# Patient Record
Sex: MALE | Race: WHITE | NOT HISPANIC OR LATINO | URBAN - METROPOLITAN AREA
[De-identification: names, ages, dates, MRNs, and addresses within clinical notes are randomized per-mention and may not be internally consistent; named-entity substitution may affect disease eponyms.]

---

## 2020-07-03 PROCEDURE — U0003 INFECTIOUS AGENT DETECTION BY NUCLEIC ACID (DNA OR RNA); SEVERE ACUTE RESPIRATORY SYNDROME CORONAVIRUS 2 (SARS-COV-2) (CORONAVIRUS DISEASE [COVID-19]), AMPLIFIED PROBE TECHNIQUE, MAKING USE OF HIGH THROUGHPUT TECHNOLOGIES AS DESCRIBED BY CMS-2020-01-R: HCPCS | Performed by: FAMILY MEDICINE

## 2020-07-06 ENCOUNTER — TELEPHONE (OUTPATIENT)
Dept: URGENT CARE | Facility: CLINIC | Age: 25
End: 2020-07-06

## 2020-09-11 ENCOUNTER — HOSPITAL ENCOUNTER (EMERGENCY)
Facility: HOSPITAL | Age: 25
Discharge: HOME | End: 2020-09-11
Admitting: EMERGENCY MEDICINE

## 2020-09-11 ENCOUNTER — APPOINTMENT (OUTPATIENT)
Dept: ULTRASOUND IMAGING | Facility: HOSPITAL | Age: 25
End: 2020-09-11

## 2020-09-11 VITALS
WEIGHT: 240 LBS | HEART RATE: 60 BPM | HEIGHT: 74 IN | BODY MASS INDEX: 30.8 KG/M2 | OXYGEN SATURATION: 97 % | DIASTOLIC BLOOD PRESSURE: 72 MMHG | TEMPERATURE: 98.4 F | SYSTOLIC BLOOD PRESSURE: 113 MMHG | RESPIRATION RATE: 17 BRPM

## 2020-09-11 DIAGNOSIS — M79.604 RIGHT LEG PAIN: Primary | ICD-10-CM

## 2020-09-11 PROCEDURE — 93926 LOWER EXTREMITY STUDY: CPT

## 2020-09-11 PROCEDURE — 93971 EXTREMITY STUDY: CPT

## 2020-09-11 PROCEDURE — 99283 EMERGENCY DEPT VISIT LOW MDM: CPT

## 2020-09-11 PROCEDURE — 96372 THER/PROPH/DIAG INJ SC/IM: CPT

## 2020-09-11 PROCEDURE — 25010000002 METHYLPREDNISOLONE PER 125 MG: Performed by: PHYSICIAN ASSISTANT

## 2020-09-11 RX ORDER — METHYLPREDNISOLONE SODIUM SUCCINATE 125 MG/2ML
80 INJECTION, POWDER, LYOPHILIZED, FOR SOLUTION INTRAMUSCULAR; INTRAVENOUS ONCE
Status: COMPLETED | OUTPATIENT
Start: 2020-09-11 | End: 2020-09-11

## 2020-09-11 RX ORDER — METHYLPREDNISOLONE 4 MG/1
TABLET ORAL
Qty: 21 TABLET | Refills: 0 | Status: SHIPPED | OUTPATIENT
Start: 2020-09-11

## 2020-09-11 RX ADMIN — METHYLPREDNISOLONE SODIUM SUCCINATE 80 MG: 125 INJECTION, POWDER, FOR SOLUTION INTRAMUSCULAR; INTRAVENOUS at 23:02

## 2020-09-12 NOTE — ED PROVIDER NOTES
Subjective   25-year-old male with no known past medical history presents to the emergency room with right lower extremity pain x2 days.  Patient states he was running and suddenly began having pain, numbness, and tingling in his right lower extremity from the knee down.  Patient states he does not know if he landed on it wrong or exactly what happened.  Patient states he went to [facility] in [place] earlier this evening and they sent him here to be checked for a blood clot.  Denies any aggravating or alleviating factors.  Denies any history of blood clots.      History provided by:  Patient   used: No        Review of Systems   Constitutional: Negative.  Negative for fever.   HENT: Negative.    Respiratory: Negative.    Cardiovascular: Negative.  Negative for chest pain.   Gastrointestinal: Negative.  Negative for abdominal pain.   Endocrine: Negative.    Genitourinary: Negative.  Negative for dysuria.   Musculoskeletal:        (+) Right leg pain   Skin: Negative.    Neurological: Negative.    Psychiatric/Behavioral: Negative.    All other systems reviewed and are negative.      No past medical history on file.    Allergies   Allergen Reactions   • Motrin [Ibuprofen] Other (See Comments)     Thinks it caused rash as a child       Past Surgical History:   Procedure Laterality Date   • EAR TUBES     • MANDIBLE SURGERY         No family history on file.    Social History     Socioeconomic History   • Marital status:      Spouse name: Not on file   • Number of children: Not on file   • Years of education: Not on file   • Highest education level: Not on file   Tobacco Use   • Smoking status: Never Smoker   Substance and Sexual Activity   • Alcohol use: Yes   • Drug use: Never   • Sexual activity: Defer           Objective   Physical Exam   Constitutional: He is oriented to person, place, and time. He appears well-developed and well-nourished. No distress.   HENT:   Head: Normocephalic  and atraumatic.   Right Ear: External ear normal.   Left Ear: External ear normal.   Nose: Nose normal.   Eyes: Pupils are equal, round, and reactive to light. Conjunctivae and EOM are normal.   Neck: Normal range of motion. Neck supple. No JVD present. No tracheal deviation present.   Cardiovascular: Normal rate, regular rhythm and normal heart sounds.   No murmur heard.  Pulses:       Dorsalis pedis pulses are 2+ on the right side, and 2+ on the left side.        Posterior tibial pulses are 2+ on the right side, and 2+ on the left side.   Bilateral lower extremity capillary refills less than 3 seconds   Pulmonary/Chest: Effort normal and breath sounds normal. No respiratory distress. He has no wheezes.   Abdominal: Soft. Bowel sounds are normal. There is no tenderness.   Musculoskeletal: Normal range of motion. He exhibits no edema or deformity.   Neurological: He is alert and oriented to person, place, and time. No cranial nerve deficit.   Skin: Skin is warm and dry. No rash noted. He is not diaphoretic. No erythema. No pallor.   Psychiatric: He has a normal mood and affect. His behavior is normal. Thought content normal.   Nursing note and vitals reviewed.      Procedures           ED Course  ED Course as of Sep 11 2342   Fri Sep 11, 2020   2338 IMPRESSION:  No deep venous thrombus in the right lower extremity.    Signer Name: Cy Meyer MD  Signed: 9/11/2020 11:13 PM  Workstation Name: [facility] Venous Doppler Lower Extremity Right (duplex) [TK]   2338 IMPRESSION:  No evidence of significant lower extremity arterial occlusive disease.    Signer Name: Cy Meyer MD  Signed: 9/11/2020 11:15 PM  Workstation Name: [facility]  [facility] Arterial Doppler Lower Extremity Right [TK]      ED Course User Index  [TK] Karen Davis PA-C                                           MDM  Number of Diagnoses or Management Options  Right leg  pain: new and requires workup     Amount and/or Complexity of Data Reviewed  Tests in the radiology section of CPT®: reviewed and ordered    Risk of Complications, Morbidity, and/or Mortality  Presenting problems: moderate  Diagnostic procedures: moderate  Management options: moderate    Patient Progress  Patient progress: stable      Final diagnoses:   Right leg pain            Karen Davis PA-C  09/11/20 [id number]

## 2023-08-11 ENCOUNTER — OFFICE VISIT (OUTPATIENT)
Dept: FAMILY MEDICINE CLINIC | Age: 28
End: 2023-08-11
Payer: COMMERCIAL

## 2023-08-11 ENCOUNTER — LAB (OUTPATIENT)
Dept: LAB | Facility: HOSPITAL | Age: 28
End: 2023-08-11
Payer: COMMERCIAL

## 2023-08-11 VITALS
DIASTOLIC BLOOD PRESSURE: 74 MMHG | OXYGEN SATURATION: 96 % | BODY MASS INDEX: 35.55 KG/M2 | HEIGHT: 74 IN | HEART RATE: 68 BPM | WEIGHT: 277 LBS | SYSTOLIC BLOOD PRESSURE: 118 MMHG

## 2023-08-11 DIAGNOSIS — E66.01 CLASS 2 SEVERE OBESITY DUE TO EXCESS CALORIES WITH SERIOUS COMORBIDITY AND BODY MASS INDEX (BMI) OF 35.0 TO 35.9 IN ADULT: Primary | ICD-10-CM

## 2023-08-11 DIAGNOSIS — E66.01 CLASS 2 SEVERE OBESITY DUE TO EXCESS CALORIES WITH SERIOUS COMORBIDITY AND BODY MASS INDEX (BMI) OF 35.0 TO 35.9 IN ADULT: ICD-10-CM

## 2023-08-11 DIAGNOSIS — E66.9 OBESITY (BMI 35.0-39.9 WITHOUT COMORBIDITY): ICD-10-CM

## 2023-08-11 LAB
25(OH)D3 SERPL-MCNC: 37.7 NG/ML (ref 30–100)
ALBUMIN SERPL-MCNC: 4.2 G/DL (ref 3.5–5.2)
ALBUMIN/GLOB SERPL: 1.6 G/DL
ALP SERPL-CCNC: 52 U/L (ref 39–117)
ALT SERPL W P-5'-P-CCNC: 32 U/L (ref 1–41)
ANION GAP SERPL CALCULATED.3IONS-SCNC: 9.2 MMOL/L (ref 5–15)
AST SERPL-CCNC: 25 U/L (ref 1–40)
BILIRUB SERPL-MCNC: 0.3 MG/DL (ref 0–1.2)
BUN SERPL-MCNC: 18 MG/DL (ref 6–20)
BUN/CREAT SERPL: 17.1 (ref 7–25)
CALCIUM SPEC-SCNC: 9.6 MG/DL (ref 8.6–10.5)
CHLORIDE SERPL-SCNC: 107 MMOL/L (ref 98–107)
CHOLEST SERPL-MCNC: 126 MG/DL (ref 0–200)
CO2 SERPL-SCNC: 23.8 MMOL/L (ref 22–29)
CREAT SERPL-MCNC: 1.05 MG/DL (ref 0.76–1.27)
CRP SERPL-MCNC: <0.3 MG/DL (ref 0–0.5)
DEPRECATED RDW RBC AUTO: 37 FL (ref 37–54)
EGFRCR SERPLBLD CKD-EPI 2021: 100.4 ML/MIN/1.73
ERYTHROCYTE [DISTWIDTH] IN BLOOD BY AUTOMATED COUNT: 12.2 % (ref 12.3–15.4)
FOLATE SERPL-MCNC: 11.2 NG/ML (ref 4.78–24.2)
GLOBULIN UR ELPH-MCNC: 2.7 GM/DL
GLUCOSE SERPL-MCNC: 86 MG/DL (ref 65–99)
HBA1C MFR BLD: 5.4 % (ref 4.8–5.6)
HCT VFR BLD AUTO: 46.2 % (ref 37.5–51)
HDLC SERPL QL: 3.41
HDLC SERPL-MCNC: 37 MG/DL (ref 40–60)
HGB BLD-MCNC: 15.7 G/DL (ref 13–17.7)
LDLC SERPL CALC-MCNC: 73 MG/DL (ref 0–100)
MCH RBC QN AUTO: 29.1 PG (ref 26.6–33)
MCHC RBC AUTO-ENTMCNC: 34 G/DL (ref 31.5–35.7)
MCV RBC AUTO: 85.6 FL (ref 79–97)
PLATELET # BLD AUTO: 289 10*3/MM3 (ref 140–450)
PMV BLD AUTO: 9.5 FL (ref 6–12)
POTASSIUM SERPL-SCNC: 4.5 MMOL/L (ref 3.5–5.2)
PROT SERPL-MCNC: 6.9 G/DL (ref 6–8.5)
RBC # BLD AUTO: 5.4 10*6/MM3 (ref 4.14–5.8)
SODIUM SERPL-SCNC: 140 MMOL/L (ref 136–145)
T3 SERPL-MCNC: 111 NG/DL (ref 80–200)
T4 FREE SERPL-MCNC: 1.62 NG/DL (ref 0.93–1.7)
TRIGL SERPL-MCNC: 82 MG/DL (ref 0–150)
TSH SERPL DL<=0.05 MIU/L-ACNC: 1.23 UIU/ML (ref 0.27–4.2)
VIT B12 BLD-MCNC: 1042 PG/ML (ref 211–946)
VLDLC SERPL-MCNC: 16 MG/DL (ref 5–40)
WBC NRBC COR # BLD: 6.02 10*3/MM3 (ref 3.4–10.8)

## 2023-08-11 PROCEDURE — 83036 HEMOGLOBIN GLYCOSYLATED A1C: CPT

## 2023-08-11 PROCEDURE — 80061 LIPID PANEL: CPT

## 2023-08-11 PROCEDURE — 86140 C-REACTIVE PROTEIN: CPT

## 2023-08-11 PROCEDURE — 84480 ASSAY TRIIODOTHYRONINE (T3): CPT

## 2023-08-11 PROCEDURE — 82607 VITAMIN B-12: CPT

## 2023-08-11 PROCEDURE — 82306 VITAMIN D 25 HYDROXY: CPT

## 2023-08-11 PROCEDURE — 36415 COLL VENOUS BLD VENIPUNCTURE: CPT

## 2023-08-11 PROCEDURE — 80050 GENERAL HEALTH PANEL: CPT

## 2023-08-11 PROCEDURE — 82746 ASSAY OF FOLIC ACID SERUM: CPT

## 2023-08-11 PROCEDURE — 84439 ASSAY OF FREE THYROXINE: CPT

## 2023-08-11 NOTE — PROGRESS NOTES
Chief Complaint  Weight Gain    Subjective        History of Present Illness  Noel Zarate is a 28 y.o. male who presents to [facility] today with interest in the weight loss program with a BMI of Body mass index is 35.56 kg/mý.. Patient is a candidate for this program due to BMI of Obese Class II: 35-39.9kg/m2. Patient denies a personal and family history of pancreatitis and medullary thyroid cancer. Patient also denies a personal history of gastroparesis or gallbladder disease. Previous diet and exercise plans attempted include Adipex and lost 20 lbs without any dietary changes. Recently joined the gym and tries to go daily. Mostly lifting weights.       History reviewed. No pertinent past medical history.  Past Surgical History:   Procedure Laterality Date    EAR TUBES      MANDIBLE SURGERY        Family History   Problem Relation Age of Onset    Heart disease Father       Social History     Socioeconomic History    Marital status:    Tobacco Use    Smoking status: Never   Vaping Use    Vaping Use: Never used   Substance and Sexual Activity    Alcohol use: Never    Drug use: Never    Sexual activity: Defer      Allergies   Allergen Reactions    Motrin [Ibuprofen] Other (See Comments)     Thinks it caused rash as a child      Current Outpatient Medications on File Prior to Visit   Medication Sig Dispense Refill    amoxicillin-clavulanate (AUGMENTIN) 875-125 MG per tablet Take 1 tablet by mouth in the morning and 1 tablet before bedtime. Do all this for 10 days. 20 tablet 0    methylPREDNISolone (MEDROL) 4 MG dose pack Take as directed on package instructions. (Patient not taking: Reported on 8/11/2023) 21 tablet 0    methylPREDNISolone (MEDROL) 4 MG dose pack Follow schedule on package instructions (Patient not taking: Reported on 8/11/2023) 21 tablet 0     No current facility-administered medications on file prior to visit.        The following portions of the patient's  "history were reviewed and updated as appropriate: allergies, current medications, past family history, past social history, past medical history, past surgical history and problem list.     Objective   Vital Signs:  Vitals:    08/11/23 0925   BP: 118/74   BP Location: Right arm   Patient Position: Sitting   Cuff Size: Large Adult   Pulse: 68   SpO2: 96%   Weight: 126 kg (277 lb)   Height: 188 cm (74\")      Estimated body mass index is 35.56 kg/mý as calculated from the following:    Height as of this encounter: 188 cm (74\").    Weight as of this encounter: 126 kg (277 lb).       Class 2 Severe Obesity (BMI >=35 and <=39.9). Obesity-related health conditions include the following: obstructive sleep apnea. Obesity is newly identified. BMI is is above average; BMI management plan is completed. We discussed portion control, increasing exercise, pharmacologic options including  , and an carrie-based approach such as [employer] Pal or Lose It.      Physical Exam  Constitutional:       Appearance: He is obese.   HENT:      Head: Normocephalic.      Mouth/Throat:      Mouth: Mucous membranes are moist. Mucous membranes are dry.   Eyes:      Pupils: Pupils are equal, round, and reactive to light.   Cardiovascular:      Rate and Rhythm: Normal rate and regular rhythm.   Pulmonary:      Effort: Pulmonary effort is normal.      Breath sounds: Normal breath sounds.   Abdominal:      General: Bowel sounds are normal.      Palpations: Abdomen is soft.   Musculoskeletal:         General: Normal range of motion.      Cervical back: Normal range of motion.   Skin:     General: Skin is warm and dry.   Neurological:      Mental Status: He is alert and oriented to person, place, and time.   Psychiatric:         Mood and Affect: Mood normal.         Behavior: Behavior normal.         Judgment: Judgment normal.        No visits with results within 3 Month(s) from this visit.   Latest known visit with results is:   Admission on 07/03/2020, " Discharged on 07/03/2020   Component Date Value Ref Range Status    SARS-CoV-2, DANIELLE 07/03/2020 Not Detected  Not Detected Final    This test was developed and its performance characteristics determined  by [employer]. This test has not been FDA cleared or  approved. This test has been authorized by FDA under an Emergency Use  Authorization (EUA). This test is only authorized for the duration of  time the declaration that circumstances exist justifying the  authorization of the emergency use of in vitro diagnostic tests for  detection of SARS-CoV-2 virus and/or diagnosis of COVID-19 infection  under section 564(b)(1) of the Act, 21 U.S.C. 360bbb-3(b)(1), unless  the authorization is terminated or revoked sooner.  When diagnostic testing is negative, the possibility of a false  negative result should be considered in the context of a patient's  recent exposures and the presence of clinical signs and symptoms  consistent with COVID-19. An individual without symptoms of COVID-19  and who is not shedding SARS-CoV-2 virus would expect to have a  negative (not detected) result in this assay.    COVID [facility] PRIORITY 07/03/2020 Comment   Final    Received        Result Review :           Assessment and Plan     Diagnoses and all orders for this visit:    1. Class 2 severe obesity due to excess calories with serious comorbidity and body mass index (BMI) of 35.0 to 35.9 in adult (Primary)    2. Obesity (BMI 35.0-39.9 without comorbidity)  -     CBC (No Diff); Future  -     Comprehensive Metabolic Panel; Future  -     C-reactive Protein; Future  -     Folate; Future  -     Hemoglobin A1c; Future  -     Lipid Panel With / Chol / HDL Ratio; Future  -     T3; Future  -     T4, Free; Future  -     TSH; Future  -     Vitamin B12; Future  -     Vitamin D,25-Hydroxy; Future           ICD-10-CM ICD-9-CM   1. Class 2 severe obesity due to excess calories with serious comorbidity and body mass index (BMI) of 35.0 to 35.9 in  adult  E66.01 278.01    Z68.35 V85.35   2. Obesity (BMI 35.0-39.9 without comorbidity)  E66.9 278.00                Patient Education:     Patient is a candidate for the program with a BMI of Body mass index is 35.56 kg/mý.. Obesity related health conditions include: BMI is above average. BMI management plan is completed. We reviewed portion control, increasing exercise and pharmacologic options. Details regarding the process of the program discussed with patient. Patient voiced understanding. Outpatient labs ordered for baseline  Patient had no further questions or concerns.     I explained that obesity and an elevated BMI can be a disease of body weight dysregulation influenced by factors including environment, genetics and hormones and intiated the discussion of semaglutides in appetite dysregulation and metabolic adaptation. We discussed possible side effects and complications of GLP1s including but not limited to nausea, vomiting, abdominal pain, bloating, constipation, diarrhea, slowed digestion, heartburn and gallstones or gallbladder disease. Reviewed that side effects are mild to moderate and dose dependent and typically subside in 4-6 weeks. Will await lab results and if appropriate, refer to [facility] pharmacists.      Follow Up   Return in about 3 months (around 11/11/2023) for Recheck.  Patient was given instructions and counseling regarding his condition or for health maintenance advice. Please see specific information pulled into the AVS if appropriate.         This document has been electronically signed by ANGELICA Bright  August 11, 2023 10:02 EDT

## 2023-08-16 ENCOUNTER — DOCUMENTATION (OUTPATIENT)
Dept: FAMILY MEDICINE CLINIC | Age: 28
End: 2023-08-16
Payer: COMMERCIAL

## 2023-08-18 DIAGNOSIS — E66.01 CLASS 2 SEVERE OBESITY WITH SERIOUS COMORBIDITY AND BODY MASS INDEX (BMI) OF 35.0 TO 35.9 IN ADULT, UNSPECIFIED OBESITY TYPE: Primary | ICD-10-CM

## 2023-08-24 ENCOUNTER — DISEASE STATE MANAGEMENT VISIT (OUTPATIENT)
Dept: PHARMACY | Facility: HOSPITAL | Age: 28
End: 2023-08-24
Payer: COMMERCIAL

## 2023-08-24 VITALS
WEIGHT: 275.2 LBS | HEART RATE: 56 BPM | DIASTOLIC BLOOD PRESSURE: 81 MMHG | BODY MASS INDEX: 35.33 KG/M2 | SYSTOLIC BLOOD PRESSURE: 139 MMHG

## 2023-08-24 RX ORDER — SEMAGLUTIDE 0.25 MG/.5ML
0.25 INJECTION, SOLUTION SUBCUTANEOUS WEEKLY
Qty: 2 ML | Refills: 0 | Status: SHIPPED | OUTPATIENT
Start: 2023-08-24

## 2023-08-24 NOTE — PROGRESS NOTES
"   Medication Management Clinic  Weight Management Program      Noel Zarate is a 28 y.o. male referred to the Medication Management Clinic by Breonna Saenz for clinical pharmacy and specialty pharmacy management of GLP1 for weight management.  The patient denies a personal history or family history of thyroid cancer and denies a personal history of pancreatitis.       Relevant Past Medical History and Co-morbidities  No past medical history on file.  Social History     Socioeconomic History    Marital status:    Tobacco Use    Smoking status: Never   Vaping Use    Vaping Use: Never used   Substance and Sexual Activity    Alcohol use: Never    Drug use: Never    Sexual activity: Defer       Allergies  Motrin [ibuprofen]    Current Medication List    Current Outpatient Medications:     Multiple Vitamins-Minerals (Multi Complete) capsule, Take 1 capsule by mouth Daily., Disp: , Rfl:     Drug Interactions  None    Relevant Laboratory Values  Lab Results   Component Value Date    CHOL 126 08/11/2023    TRIG 82 08/11/2023    HDL 37 (L) 08/11/2023    LDL 73 08/11/2023     Body mass index is 35.33 kg/mý.    Vaccinations:   Patient recommended to keep up with routine vaccinations.     Goals of Therapy  Clinical Goals or Therapeutic Targets: Prevent weight associated co-morbidities and complications      Date   8/24/23       Weight (lb) 275.2lb       BMI kg/ 35.33       Waist Circumference (in)  48.4\"           Medication Assessment & Plan    Patient has current BMI of 35.33, which is considered Class II Obesity    Will order Wegovy 0.25mg SC weekly.      The following medications will need dose adjustments/closer monitoring once the GLP1 is started: N/A    Discussed lifestyle modifications, including diet and exercise.  Patient education provided.     WEGOVYT (semaglutide)  Medication Expectations   Why am I taking this medication? You are taking Wegovy to help you lose weight and to help with weight-related medical " problems.  Wegovy will also help you keep your weight controlled.  It should be used along with a reduced calorie diet and increased physical activity.   What should I expect while on this medication? You should lose some weight; however, people respond to medications differently.  Talk with your healthcare provider about realistic expectations for your weight loss goals.  In clinical trials, patients lost an average of ~35 lbs. over a ~15-month period.   How does the medication work? Wegovy is an injection that works with one of your body's natural responses to weight loss.  It works like a hormone, called GLP1, that helps regulate your appetite; this helps you eat less and can lead to weight loss.  This medication also slows down food from leaving your stomach, making you feel linda for longer.   How long will I be on this medication for? The amount of time you will be on this medication will be determined by your doctor based on your weight loss and how well you tolerate the medication. Do not abruptly stop this medication without talking to your doctor first.    How do I take this medication? Take as directed on your prescription label. Wegovy is supplied in a single-use pen for each dose and you will use a new pre-filled pen each week.  It is injected under the skin (subcutaneously) of your stomach, thigh or upper arm.  You may inject in the same body area each week, but make sure to use a different spot each time.  Use this medication once weekly, on the same day each week, with or without food.      First, choose your injection site and clean the area, allowing it to dry completely.  Remove the pen cap by pulling it straight off.    Push the pen firmly against your skin at your injection site.    You will hear a click once the injection starts and a second click indicating the injection is in process.  The injection will take about 5-10 seconds.    Continue pressing against your skin until the yellow bar  "stops moving, and then you will slowly lift the pen from your skin and dispose of the pen (detailed below in "Medication Storage / Handling").   What are some possible side effects? You may notice you don't feel as hungry, especially when you first start using Wegovy.  Some common side effects are nausea, diarrhea, vomiting, stomach pain, gas, bloating, heartburn and constipation.  Additionally, headache, tiredness, and dizziness may occur.  Redness, itching, and/or swelling can occur where the shot was given.  You should also monitor for low blood sugar (hypoglycemia), especially if you are taking Wegovy with other medications that cause low blood sugar.      Stop using Wegovy and call your doctor immediately if you have severe pain in your stomach area that will not go away as this could be a sign of pancreatitis (inflammation of your pancreas).  Talk with your doctor if you have changes in vision while taking Wegovy.   What happens if I miss a dose? If you miss a dose, take it as soon as you remember as long as your next scheduled dose is more than 2 days away.  If your next scheduled dose is within 2 days, take that regularly scheduled dose and skip your missed dose.      If you miss more than 2 weeks of doses, take the next dose on the regularly scheduled day or call your healthcare provider to talk about how to restart your Wegovy.     Medication Safety   What are things I should warn my doctor immediately about? Do not use Wegovy if you or a family member have ever had medullary thyroid cancer (MTC) or Multiple Endocrine Neoplasia syndrome type 2 (MEN 2).  Tell your doctor if you get a lump or swelling in your neck, hoarseness, difficulty swallowing, or feel short of breath (these may be symptoms of thyroid cancer).    Tell your doctor if you have or have had problems with your kidneys, pancreas, or liver, or if you have a history of diabetic retinopathy.  Tell your doctor if you have problem digesting food " or slowed emptying of your stomach (gastroparesis).  Talk to your doctor if you are pregnant, planning to become pregnant, or breastfeeding. Also tell your doctor if you notice any signs/symptoms of an allergic reaction (rash, hives, difficulty breathing, etc.).   What are things that I should be cautious of? Be cautious of any side effect from this medication. Talk to your doctor if any new ones develop or aren't getting better.   What are some medications that can interact with this one? Taking Wegovy with other medications that may also lower your blood sugar such as insulin and glipizide/glimepiride/glyburide may increase the risk of low blood sugar (hypoglycemia). Your doctor may reduce the dose of these medications when you start Wegovy to minimize low blood sugars.  It should not be taken with other medicines called GLP-1 receptor agonists, because these work the same way as Wegovy.  Because Wegovy slows stomach emptying, it can affect the way some medicines work. Always tell your doctor or pharmacist immediately if you start taking any new medications, including over-the-counter medications, vitamins, and herbal supplements.      Medication Storage/Handling   How should I handle this medication? Keep this medication out of reach of pets/children and keep the pen capped when not in use.  Do not share your pens with others.   How does this medication need to be stored? Store in the refrigerator [2øC to 8øC (36øF to 46øF)]. Prior to cap removal, the pen can be kept at room temperature [8øC to 30øC (46øF to 86øF)] for up to 28 days. Do not freeze; protect from light. Keep in original carton until time of administration.   How should I dispose of this medication? Used Wegovy pens should discarded after each use (for single use only). Place your used Wegovy pen and needle in an approved sharps container after use.  If you do not have a sharps container, you may use a household container made of heavy-duty plastic  with a tight-fitting lid that is leak resistant (e.g., heavy-duty plastic laundry detergent bottle).    If your doctor decides to stop this medication, take to your local police station for proper disposal. Some pharmacies also have take-back bins for medication drop-off.      Resources/Support   How can I remind myself to take this medication? You can download reminder apps to help you manage your refills. You may also set an alarm on your phone to remind you.    Is financial support available?  [employer] can provide co-pay cards if you have commercial insurance or patient assistance if you have Medicare or no insurance.    Which vaccines are recommended for me? Talk to your doctor about these vaccines:   Influenza (flu)  Coronavirus (COVID-19)  Pneumococcal (pneumonia)  Tdap (tetanus, diphtheria, pertussis)  Hepatitis B  Zoster (shingles)        Worked with patient to create SMART Goal(s):     Drinking 64oz of water each day  Going to the gym 5 days a week  Avoiding bread- decrease consumption    Will follow-up with patient in clinic in 4 weeks.     Maria Eugenia Shepherd RPH  8/24/2023  11:35 EDT

## 2023-09-21 ENCOUNTER — DISEASE STATE MANAGEMENT VISIT (OUTPATIENT)
Dept: PHARMACY | Facility: HOSPITAL | Age: 28
End: 2023-09-21
Payer: COMMERCIAL

## 2023-09-21 VITALS
DIASTOLIC BLOOD PRESSURE: 90 MMHG | SYSTOLIC BLOOD PRESSURE: 140 MMHG | BODY MASS INDEX: 33.96 KG/M2 | WEIGHT: 264.6 LBS | HEIGHT: 74 IN | HEART RATE: 64 BPM

## 2023-09-21 RX ORDER — SEMAGLUTIDE 0.5 MG/.5ML
0.5 INJECTION, SOLUTION SUBCUTANEOUS WEEKLY
Qty: 2 ML | Refills: 0 | Status: SHIPPED | OUTPATIENT
Start: 2023-09-21

## 2023-09-21 NOTE — PROGRESS NOTES
Medication Management Clinic  Weight Management Program      Noel Zarate is a 28 y.o. male referred to the Medication Management Clinic by Breonna Saenz for clinical pharmacy and specialty pharmacy management of GLP1 for weight management.  The patient denies a personal history or family history of thyroid cancer and denies a personal history of pancreatitis.     Patient is currently on Wegovy 0.25 mg. Patient denies any lumps/swelling/hoarseness in neck/throat.  Patient reports tolerating medication well other than some occasional acid reflux. Patient denies any missed doses or trouble giving injection. Patient reports that he has felt the appetite suppression and that he is doing well moving towards achieving his SMART goals. He wishes to continue these goals and is ready to titrate dose today.       Relevant Past Medical History and Co-morbidities  No past medical history on file.  Social History     Socioeconomic History    Marital status:    Tobacco Use    Smoking status: Never   Vaping Use    Vaping Use: Never used   Substance and Sexual Activity    Alcohol use: Never    Drug use: Never    Sexual activity: Defer       Allergies  Motrin [ibuprofen]    Current Medication List    Current Outpatient Medications:     Multiple Vitamins-Minerals (Multi Complete) capsule, Take 1 capsule by mouth Daily., Disp: , Rfl:     Semaglutide-Weight Management (Wegovy) 0.25 MG/0.5ML solution auto-injector, Inject 0.25 mg under the skin into the appropriate area as directed 1 (One) Time Per Week., Disp: 2 mL, Rfl: 0    Drug Interactions  None    Relevant Laboratory Values  Lab Results   Component Value Date    CHOL 126 08/11/2023    TRIG 82 08/11/2023    HDL 37 (L) 08/11/2023    LDL 73 08/11/2023     There is no height or weight on file to calculate BMI.    Vaccinations:   Patient recommended to keep up with routine vaccinations.     Goals of Therapy  Clinical Goals or Therapeutic Targets: Prevent weight associated  "co-morbidities and complications      Date   8/24/23 9/21/23      Weight (lb) 275.2lb 264. 6 lbs      BMI kg/ 35.33 33.97      Waist Circumference (in)  48.4\" 47.25\"          Medication Assessment & Plan    Patient has current BMI of 35.33, which is considered Class II Obesity. Patient has lost 10.6 lbs. Congratulated patient on his success thus far.     Will order Wegovy 0.5mg SC weekly.      The following medications will need dose adjustments/closer monitoring once the GLP1 is started: N/A    Discussed lifestyle modifications, including diet and exercise.  Patient education provided.       Worked with patient to create SMART Goal(s):  continue to target these goals     Drinking 64oz of water each day  Going to the gym 5 days a week  Avoiding bread- decrease consumption    Will follow-up with patient in clinic in 4 weeks.     Sabrina Monge. Rangel, PharmD  9/21/2023  09:29 EDT                    "

## 2023-10-19 ENCOUNTER — DISEASE STATE MANAGEMENT VISIT (OUTPATIENT)
Dept: PHARMACY | Facility: HOSPITAL | Age: 28
End: 2023-10-19
Payer: COMMERCIAL

## 2023-10-19 VITALS
BODY MASS INDEX: 33.65 KG/M2 | SYSTOLIC BLOOD PRESSURE: 132 MMHG | HEIGHT: 74 IN | WEIGHT: 262.2 LBS | DIASTOLIC BLOOD PRESSURE: 89 MMHG | HEART RATE: 79 BPM

## 2023-10-19 DIAGNOSIS — E66.01 MORBID (SEVERE) OBESITY DUE TO EXCESS CALORIES: Primary | ICD-10-CM

## 2023-10-19 RX ORDER — SEMAGLUTIDE 1 MG/.5ML
1 INJECTION, SOLUTION SUBCUTANEOUS WEEKLY
Qty: 2 ML | Refills: 0 | Status: SHIPPED | OUTPATIENT
Start: 2023-10-19

## 2023-10-19 NOTE — ADDENDUM NOTE
"10/17:  S:  70 y.o.male s/p Open Total Proctocolectomy with End Ileostomy  POD# 2.  Patient doing well, some bloating and pain as expected and no real bowl function out of ileostomy yet.  His pain is controlled with PCA.  He is not yet ambulating, complains of some numbness in the right leg which is improving but limiting him.  Learning about ostomy care    O:  /80   Pulse 60   Temp 36.2 °C (97.2 °F) (Temporal)   Resp 18   Ht 1.727 m (5' 8\")   Wt 64 kg (141 lb 1.5 oz)   SpO2 95%   I/O last 3 completed shifts:  In: 1478 [P.O.:480; I.V.:998]  Out: 1345 [Urine:1150; Drains:195]  Recent Labs     10/16/22  0522 10/17/22  0227   SODIUM 131* 131*   POTASSIUM 4.5 4.4   CHLORIDE 100 100   CO2 21 21   GLUCOSE 161* 102*   BUN 11 11   CREATININE 0.67 0.72   CALCIUM 7.0* 7.1*     Recent Labs     10/16/22  0522 10/17/22  0227   WBC 8.7 11.0*   RBC 4.11* 3.62*   HEMOGLOBIN 12.9* 11.5*   HEMATOCRIT 38.4* 34.0*   MCV 93.4 93.9   MCH 31.4 31.8   MCHC 33.6* 33.8   RDW 45.2 46.4   PLATELETCT 295 262   MPV 10.4 10.3       Alert and Oriented x3, No Acute Distress  Normal Respiratory Effort  Abdomen soft, appropriately tender  Incisions/Bandages clean/dry/intact  Extremities warm and well perfused  Ostomy pink and healthy, output scant fluid  ELLEN Output Serosanguinous-80cc    A/P:  Pain control with PCA  Diet clears only  Fluids continue  Ambulate tid and ad chrissie  ELLEN in place until output decreases  PT/OT consults  Ostomy care and teaching    " Addended by: SERA CHURCHILL on: 10/19/2023 10:34 AM     Modules accepted: Orders

## 2023-10-19 NOTE — PROGRESS NOTES
[facility]  Weight Management Program      Noel Zarate is a 28 y.o. male referred to the [facility] by Breonna Saenz for clinical pharmacy and specialty pharmacy management of GLP1 for weight management.  The patient denies a personal history or family history of thyroid cancer and denies a personal history of pancreatitis.     Patient is currently on Wegovy 0.5 mg. Patient denies any lumps/swelling/hoarseness in neck/throat.  Patient reports tolerating medication well other than some occasional acid reflux. He currently takes an OTC stool softener to prevent issues with constipation. Patient denies any missed doses or trouble giving injection. Patient reports that he does well with his water intake goal but has not been going to the gym as much lately. He would like to continue targeting these SMART goals.    Relevant Past Medical History and Co-morbidities  No past medical history on file.  Social History     Socioeconomic History    Marital status:    Tobacco Use    Smoking status: Never   Vaping Use    Vaping Use: Never used   Substance and Sexual Activity    Alcohol use: Never    Drug use: Never    Sexual activity: Defer       Allergies  Motrin [ibuprofen]    Current Medication List    Current Outpatient Medications:     Multiple Vitamins-Minerals (Multi Complete) capsule, Take 1 capsule by mouth Daily., Disp: , Rfl:     Semaglutide-Weight Management (Wegovy) 0.5 MG/0.5ML solution auto-injector, Inject 0.5 mL under the skin into the appropriate area as directed 1 (One) Time Per Week., Disp: 2 mL, Rfl: 0    Drug Interactions  None    Relevant Laboratory Values  Lab Results   Component Value Date    CHOL 126 08/11/2023    TRIG 82 08/11/2023    HDL 37 (L) 08/11/2023    LDL 73 08/11/2023     There is no height or weight on file to calculate BMI.    Vaccinations:   Patient recommended to keep up with routine vaccinations.     Goals of Therapy  Clinical Goals or  "Therapeutic Targets: Prevent weight associated co-morbidities and complications      Date   8/24/23   9/21/23   10/19/23     Weight (lb) 275.2lb 264. 6 lbs 262.2 lb     BMI kg/ 35.33 33.97 33.66     Waist Circumference (in)  48.4\" 47.25\" 46.5\"         Medication Assessment & Plan    Patient has current BMI of 33.66, which is considered Class II Obesity. Patient has lost 13 lbs. Congratulated patient on his success thus far.     Will order Wegovy 1 mg SC weekly.      The following medications will need dose adjustments/closer monitoring once the GLP1 is started: N/A    Discussed lifestyle modifications, including diet and exercise.  Patient education provided.     Worked with patient to create SMART Goal(s):  continue to target these goals     Drinking 64oz of water each day  Going to the gym 5 days a week  Avoiding bread- decrease consumption    Advised patient to get labs before next visit: Placed order for CMP, Thyroid Panel, Lipid Panel, Hgb A1C    Will follow-up with patient in clinic in 4 weeks.     Karla Lam, PharmD  10/19/2023  09:25 EDT                    "

## 2023-11-15 ENCOUNTER — DISEASE STATE MANAGEMENT VISIT (OUTPATIENT)
Dept: PHARMACY | Facility: HOSPITAL | Age: 28
End: 2023-11-15
Payer: COMMERCIAL

## 2023-11-15 ENCOUNTER — LAB (OUTPATIENT)
Dept: LAB | Facility: HOSPITAL | Age: 28
End: 2023-11-15
Payer: COMMERCIAL

## 2023-11-15 ENCOUNTER — OFFICE VISIT (OUTPATIENT)
Dept: FAMILY MEDICINE CLINIC | Age: 28
End: 2023-11-15
Payer: COMMERCIAL

## 2023-11-15 VITALS
BODY MASS INDEX: 32.7 KG/M2 | HEIGHT: 74 IN | HEART RATE: 65 BPM | WEIGHT: 254.8 LBS | SYSTOLIC BLOOD PRESSURE: 119 MMHG | DIASTOLIC BLOOD PRESSURE: 76 MMHG

## 2023-11-15 VITALS
BODY MASS INDEX: 32.67 KG/M2 | OXYGEN SATURATION: 98 % | HEART RATE: 78 BPM | SYSTOLIC BLOOD PRESSURE: 140 MMHG | DIASTOLIC BLOOD PRESSURE: 81 MMHG | WEIGHT: 254.6 LBS | HEIGHT: 74 IN

## 2023-11-15 DIAGNOSIS — K21.9 CHRONIC GERD: ICD-10-CM

## 2023-11-15 DIAGNOSIS — E66.9 OBESITY (BMI 30.0-34.9): Primary | ICD-10-CM

## 2023-11-15 LAB
ALBUMIN SERPL-MCNC: 4.6 G/DL (ref 3.5–5.2)
ALBUMIN/GLOB SERPL: 1.9 G/DL
ALP SERPL-CCNC: 51 U/L (ref 39–117)
ALT SERPL W P-5'-P-CCNC: 15 U/L (ref 1–41)
ANION GAP SERPL CALCULATED.3IONS-SCNC: 10.8 MMOL/L (ref 5–15)
AST SERPL-CCNC: 19 U/L (ref 1–40)
BILIRUB SERPL-MCNC: 0.4 MG/DL (ref 0–1.2)
BUN SERPL-MCNC: 14 MG/DL (ref 6–20)
BUN/CREAT SERPL: 13.3 (ref 7–25)
CALCIUM SPEC-SCNC: 9.6 MG/DL (ref 8.6–10.5)
CHLORIDE SERPL-SCNC: 106 MMOL/L (ref 98–107)
CHOLEST SERPL-MCNC: 125 MG/DL (ref 0–200)
CO2 SERPL-SCNC: 26.2 MMOL/L (ref 22–29)
CREAT SERPL-MCNC: 1.05 MG/DL (ref 0.76–1.27)
EGFRCR SERPLBLD CKD-EPI 2021: 100.4 ML/MIN/1.73
GLOBULIN UR ELPH-MCNC: 2.4 GM/DL
GLUCOSE SERPL-MCNC: 87 MG/DL (ref 65–99)
HBA1C MFR BLD: 5 % (ref 4.8–5.6)
HDLC SERPL-MCNC: 44 MG/DL (ref 40–60)
LDLC SERPL CALC-MCNC: 69 MG/DL (ref 0–100)
LDLC/HDLC SERPL: 1.59 {RATIO}
POTASSIUM SERPL-SCNC: 4.3 MMOL/L (ref 3.5–5.2)
PROT SERPL-MCNC: 7 G/DL (ref 6–8.5)
SODIUM SERPL-SCNC: 143 MMOL/L (ref 136–145)
T-UPTAKE NFR SERPL: 1 TBI (ref 0.8–1.3)
T4 SERPL-MCNC: 6.58 MCG/DL (ref 4.5–11.7)
TRIGL SERPL-MCNC: 55 MG/DL (ref 0–150)
TSH SERPL DL<=0.05 MIU/L-ACNC: 1.58 UIU/ML (ref 0.27–4.2)
VLDLC SERPL-MCNC: 12 MG/DL (ref 5–40)

## 2023-11-15 RX ORDER — SEMAGLUTIDE 1.7 MG/.75ML
1.7 INJECTION, SOLUTION SUBCUTANEOUS WEEKLY
Qty: 3 ML | Refills: 0 | Status: SHIPPED | OUTPATIENT
Start: 2023-11-15

## 2023-11-15 NOTE — PROGRESS NOTES
Chief Complaint  Weight Gain/ has lost weight since starting Wegovy     Subjective        Noel Zarate is a 28 y.o. male who presents to [facility] for follow up of weight loss management.   History of Present Illness  Pts blood pressure is slightly elevated today. We discussed his history and he states that it tends to run higher at his medical appointments. He will monitor randomly at home. Pt has attempted to lower his carbohydrate intake such as avoiding bread. Since starting Wegovy, he has lost 13 lbs. His job is physical but pt is aware he could increase exercise to help assist with further weight loss. He has experienced some constipation and reflux. This is controlled with OTC medications on a prn basis. He denies any abdominal pain. Pt is fasting this am and will get follow up labs done.     No past medical history on file.  Past Surgical History:   Procedure Laterality Date    EAR TUBES      MANDIBLE SURGERY        Family History   Problem Relation Age of Onset    Heart disease Father       Social History     Socioeconomic History    Marital status:    Tobacco Use    Smoking status: Never   Vaping Use    Vaping Use: Never used   Substance and Sexual Activity    Alcohol use: Never    Drug use: Never    Sexual activity: Defer      Allergies   Allergen Reactions    Motrin [Ibuprofen] Other (See Comments)     Thinks it caused rash as a child      Current Outpatient Medications on File Prior to Visit   Medication Sig Dispense Refill    Multiple Vitamins-Minerals (Multi Complete) capsule Take 1 capsule by mouth Daily.      Semaglutide-Weight Management (Wegovy) 1 MG/0.5ML solution auto-injector Inject 0.5 mL under the skin into the appropriate area as directed 1 (One) Time Per Week. 2 mL 0     No current facility-administered medications on file prior to visit.        The following portions of the patient's history were reviewed and updated as appropriate: allergies,  "current medications, past family history, past social history, past medical history, past surgical history and problem list.     Objective   Vital Signs:  /81 (BP Location: Right arm, Patient Position: Sitting, Cuff Size: Large Adult)   Pulse 78   Ht 188 cm (74.02\")   Wt 115 kg (254 lb 9.6 oz)   SpO2 98%   BMI 32.67 kg/m²   Estimated body mass index is 32.67 kg/m² as calculated from the following:    Height as of this encounter: 188 cm (74.02\").    Weight as of this encounter: 115 kg (254 lb 9.6 oz).               Physical Exam  Constitutional:       Appearance: He is obese.   HENT:      Head: Normocephalic.      Mouth/Throat:      Mouth: Mucous membranes are moist.      Pharynx: Oropharynx is clear.   Eyes:      Pupils: Pupils are equal, round, and reactive to light.   Cardiovascular:      Rate and Rhythm: Normal rate and regular rhythm.      Pulses: Normal pulses.      Heart sounds: Normal heart sounds.   Pulmonary:      Effort: Pulmonary effort is normal.      Breath sounds: Normal breath sounds.   Abdominal:      General: Abdomen is flat. Bowel sounds are normal.      Palpations: Abdomen is soft.   Musculoskeletal:         General: Normal range of motion.      Cervical back: Normal range of motion.   Skin:     General: Skin is warm and dry.   Neurological:      General: No focal deficit present.      Mental Status: He is alert and oriented to person, place, and time.   Psychiatric:         Mood and Affect: Mood normal.         Thought Content: Thought content normal.         Judgment: Judgment normal.          No visits with results within 3 Month(s) from this visit.   Latest known visit with results is:   Lab on 08/11/2023   Component Date Value Ref Range Status    WBC 08/11/2023 6.02  3.40 - 10.80 10*3/mm3 Final    RBC 08/11/2023 5.40  4.14 - 5.80 10*6/mm3 Final    Hemoglobin 08/11/2023 15.7  13.0 - 17.7 g/dL Final    Hematocrit 08/11/2023 46.2  37.5 - 51.0 % Final    MCV 08/11/2023 85.6  79.0 - " 97.0 fL Final    MCH 08/11/2023 29.1  26.6 - 33.0 pg Final    MCHC 08/11/2023 34.0  31.5 - 35.7 g/dL Final    RDW 08/11/2023 12.2 (L)  12.3 - 15.4 % Final    RDW-SD 08/11/2023 37.0  37.0 - 54.0 fl Final    MPV 08/11/2023 9.5  6.0 - 12.0 fL Final    Platelets 08/11/2023 289  140 - 450 10*3/mm3 Final    Glucose 08/11/2023 86  65 - 99 mg/dL Final    BUN 08/11/2023 18  6 - 20 mg/dL Final    Creatinine 08/11/2023 1.05  0.76 - 1.27 mg/dL Final    Sodium 08/11/2023 140  136 - 145 mmol/L Final    Potassium 08/11/2023 4.5  3.5 - 5.2 mmol/L Final    Chloride 08/11/2023 107  98 - 107 mmol/L Final    CO2 08/11/2023 23.8  22.0 - 29.0 mmol/L Final    Calcium 08/11/2023 9.6  8.6 - 10.5 mg/dL Final    Total Protein 08/11/2023 6.9  6.0 - 8.5 g/dL Final    Albumin 08/11/2023 4.2  3.5 - 5.2 g/dL Final    ALT (SGPT) 08/11/2023 32  1 - 41 U/L Final    AST (SGOT) 08/11/2023 25  1 - 40 U/L Final    Alkaline Phosphatase 08/11/2023 52  39 - 117 U/L Final    Total Bilirubin 08/11/2023 0.3  0.0 - 1.2 mg/dL Final    Globulin 08/11/2023 2.7  gm/dL Final    A/G Ratio 08/11/2023 1.6  g/dL Final    BUN/Creatinine Ratio 08/11/2023 17.1  7.0 - 25.0 Final    Anion Gap 08/11/2023 9.2  5.0 - 15.0 mmol/L Final    eGFR 08/11/2023 100.4  >60.0 mL/min/1.73 Final    C-Reactive Protein 08/11/2023 <0.30  0.00 - 0.50 mg/dL Final    Folate 08/11/2023 11.20  4.78 - 24.20 ng/mL Final    Hemoglobin A1C 08/11/2023 5.40  4.80 - 5.60 % Final    Total Cholesterol 08/11/2023 126  0 - 200 mg/dL Final    Triglycerides 08/11/2023 82  0 - 150 mg/dL Final    HDL Cholesterol 08/11/2023 37 (L)  40 - 60 mg/dL Final    LDL Cholesterol  08/11/2023 73  0 - 100 mg/dL Final    VLDL Cholesterol 08/11/2023 16  5 - 40 mg/dL Final    Chol/HDL Ratio 08/11/2023 3.41   Final    T3, Total 08/11/2023 111.0  80.0 - 200.0 ng/dl Final    Free T4 08/11/2023 1.62  0.93 - 1.70 ng/dL Final    TSH 08/11/2023 1.230  0.270 - 4.200 uIU/mL Final    Vitamin B-12 08/11/2023 1,042 (H)  211 - 946 pg/mL  Final    25 Hydroxy, Vitamin D 08/11/2023 37.7  30.0 - 100.0 ng/ml Final       Result Review :         Assessment and Plan     Diagnoses and all orders for this visit:    1. Obesity (BMI 30.0-34.9) (Primary)    2. Chronic GERD              Pt is tolerating the medication and understands risks and benefits as discussed with pharmacist. Pt wants to continue medication.  Pt will continue Wegovy.       Follow Up   Return in about 3 months (around 2/15/2024).  Patient was given instructions and counseling regarding his condition or for health maintenance advice. Please see specific information pulled into the AVS if appropriate.         This document has been electronically signed by ANGELICA Bright  November 15, 2023 09:04 EST

## 2023-11-15 NOTE — PROGRESS NOTES
[facility]  Weight Management Program      Noel Zarate is a 28 y.o. male referred to the [facility] by Breonna Saenz for clinical pharmacy and specialty pharmacy management of GLP1 for weight management.  The patient denies a personal history or family history of thyroid cancer and denies a personal history of pancreatitis.     Patient is currently on Wegovy 1 mg. Patient denies any lumps/swelling/hoarseness in neck/throat.  Patient reports having a couple episodes of vomiting with the first couple of 1 mg doses. He attributes this to eating too much. He currently takes an OTC stool softener to prevent issues with constipation. Patient denies any missed doses or trouble giving injection. Patient reports that he does well with his water intake goal but has not been going to the gym as much lately since starting a new job. He would like to continue targeting these SMART goals.      Relevant Past Medical History and Co-morbidities  No past medical history on file.  Social History     Socioeconomic History    Marital status:    Tobacco Use    Smoking status: Never   Vaping Use    Vaping Use: Never used   Substance and Sexual Activity    Alcohol use: Never    Drug use: Never    Sexual activity: Defer       Allergies  Motrin [ibuprofen]    Current Medication List    Current Outpatient Medications:     Multiple Vitamins-Minerals (Multi Complete) capsule, Take 1 capsule by mouth Daily., Disp: , Rfl:     Semaglutide-Weight Management (Wegovy) 1.7 MG/0.75ML solution auto-injector, Inject 0.75 mL under the skin into the appropriate area as directed 1 (One) Time Per Week., Disp: 3 mL, Rfl: 0    Drug Interactions  None    Relevant Laboratory Values  Lab Results   Component Value Date    CHOL 126 08/11/2023    TRIG 82 08/11/2023    HDL 37 (L) 08/11/2023    LDL 73 08/11/2023     Body mass index is 32.7 kg/m².    Vaccinations:   Patient recommended to keep up with routine vaccinations.  "    Goals of Therapy  Clinical Goals or Therapeutic Targets: Prevent weight associated co-morbidities and complications      Date   8/24/23   9/21/23   10/19/23   11/15/23    Weight (lb) 275.2lb 264. 6 lbs 262.2 lb 254.8 lb    BMI kg/ 35.33 33.97 33.66 32.70    Waist Circumference (in)  48.4\" 47.25\" 46.5\" 46\"        Medication Assessment & Plan    Patient has current BMI of 32.70, which is considered Class II Obesity. Patient has lost 20.4 lbs. Congratulated patient on his success thus far.     Will order Wegovy 1.7 mg SC weekly.      The following medications will need dose adjustments/closer monitoring once the GLP1 is started: N/A    Discussed lifestyle modifications, including diet and exercise.  Patient education provided.     Worked with patient to create SMART Goal(s):  continue to target these goals   Drinking 64oz of water each day  Going to the gym 5 days a week  Avoiding bread- decrease consumption    Reminded patient to have labs drawn. Order placed for CMP, Thyroid Panel, Lipid Panel, Hgb A1C at previous appointment. Patient planning to go to lab following appointment today.    Will follow-up with patient in clinic in 4 weeks.     Karla Lam, PharmD  11/15/2023  10:47 EST                    "

## 2023-11-16 ENCOUNTER — APPOINTMENT (OUTPATIENT)
Dept: PHARMACY | Facility: HOSPITAL | Age: 28
End: 2023-11-16
Payer: COMMERCIAL

## 2023-12-15 ENCOUNTER — DISEASE STATE MANAGEMENT VISIT (OUTPATIENT)
Dept: PHARMACY | Facility: HOSPITAL | Age: 28
End: 2023-12-15
Payer: COMMERCIAL

## 2023-12-15 VITALS
WEIGHT: 249.2 LBS | DIASTOLIC BLOOD PRESSURE: 82 MMHG | BODY MASS INDEX: 31.98 KG/M2 | HEIGHT: 74 IN | SYSTOLIC BLOOD PRESSURE: 131 MMHG | HEART RATE: 78 BPM

## 2023-12-15 RX ORDER — SEMAGLUTIDE 1.7 MG/.75ML
1.7 INJECTION, SOLUTION SUBCUTANEOUS WEEKLY
Qty: 3 ML | Refills: 0 | Status: SHIPPED | OUTPATIENT
Start: 2023-12-15

## 2023-12-15 NOTE — PROGRESS NOTES
[facility]  Weight Management Program      Noel Zarate is a 28 y.o. male referred to the [facility] by Breonna Saenz for clinical pharmacy and specialty pharmacy management of GLP1 for weight management.  The patient denies a personal history or family history of thyroid cancer and denies a personal history of pancreatitis.     Patient is currently on Wegovy 1.7 mg. Patient denies any lumps/swelling/hoarseness in neck/throat.  Patient reports that he is tolerating well other than nausea that is worse in the mornings.  He denies any vomiting. He currently takes an OTC stool softener to prevent issues with constipation. Patient denies any missed doses or trouble giving injection. Patient reports that he does well with his water intake goal but has not been going to the gym as much lately since starting a new job. He would like to continue targeting these SMART goals.Patient prefers to remain at the 1.7 mg dose due to the nausea and does not think he can tolerate titration at this time.     Goal weight for patient reported as 225#      Relevant Past Medical History and Co-morbidities  No past medical history on file.  Social History     Socioeconomic History    Marital status:    Tobacco Use    Smoking status: Never   Vaping Use    Vaping Use: Never used   Substance and Sexual Activity    Alcohol use: Never    Drug use: Never    Sexual activity: Defer       Allergies  Motrin [ibuprofen]    Current Medication List    Current Outpatient Medications:     Multiple Vitamins-Minerals (Multi Complete) capsule, Take 1 capsule by mouth Daily., Disp: , Rfl:     Semaglutide-Weight Management (Wegovy) 1.7 MG/0.75ML solution auto-injector, Inject 0.75 mL under the skin into the appropriate area as directed 1 (One) Time Per Week., Disp: 3 mL, Rfl: 0    Drug Interactions  None    Relevant Laboratory Values  Lab Results   Component Value Date    CHOL 125 11/15/2023    TRIG 55 11/15/2023  "   HDL 44 11/15/2023    LDL 69 11/15/2023     There is no height or weight on file to calculate BMI.    Vaccinations:   Patient recommended to keep up with routine vaccinations.     Goals of Therapy  Clinical Goals or Therapeutic Targets: Prevent weight associated co-morbidities and complications      Date   8/24/23   9/21/23   10/19/23   11/15/23   12/14/23   Weight (lb) 275.2lb 264. 6 lbs 262.2 lb 254.8 lb 249.2 lb   BMI kg/ 35.33 33.97 33.66 32.70 31.98   Waist Circumference (in)  48.4\" 47.25\" 46.5\" 46\" 42\"       Medication Assessment & Plan    Patient has current BMI of 31.98, which is considered Class II Obesity. Patient has lost 26 lbs. Congratulated patient on his success thus far.     Will order Wegovy 1.7 mg SC weekly.      The following medications will need dose adjustments/closer monitoring once the GLP1 is started: N/A    Discussed lifestyle modifications, including diet and exercise.  Patient education provided.     Worked with patient to create SMART Goal(s):  continue to target these goals   Drinking 64oz of water each day  Going to the gym 5 days a week  Avoiding bread- decrease consumption    Patient lab work reviewed. WNL.     Will follow-up with patient in clinic in 4 weeks.     Sabrina Monge. Rangel, PharmD  12/15/2023  09:10 EST                    "

## 2024-01-12 ENCOUNTER — DISEASE STATE MANAGEMENT VISIT (OUTPATIENT)
Dept: PHARMACY | Facility: HOSPITAL | Age: 29
End: 2024-01-12
Payer: COMMERCIAL

## 2024-01-12 VITALS
WEIGHT: 239.2 LBS | BODY MASS INDEX: 30.7 KG/M2 | HEIGHT: 74 IN | HEART RATE: 114 BPM | SYSTOLIC BLOOD PRESSURE: 130 MMHG | DIASTOLIC BLOOD PRESSURE: 89 MMHG

## 2024-01-12 RX ORDER — SEMAGLUTIDE 1.7 MG/.75ML
1.7 INJECTION, SOLUTION SUBCUTANEOUS WEEKLY
Qty: 3 ML | Refills: 0 | Status: SHIPPED | OUTPATIENT
Start: 2024-01-12

## 2024-01-12 NOTE — PROGRESS NOTES
[facility]  Weight Management Program      Noel Zarate is a 29 y.o. male referred to the [facility] by Breonna Saenz for clinical pharmacy and specialty pharmacy management of GLP1 for weight management.  The patient denies a personal history or family history of thyroid cancer and denies a personal history of pancreatitis.     Patient is currently on Wegovy 1.7 mg. Patient denies any lumps/swelling/hoarseness in neck/throat.  Patient reports that he is tolerating well other than nausea that is worse in the mornings.  He denies any vomiting. He currently takes an OTC stool softener to prevent issues with constipation. Patient denies any missed doses or trouble giving injection. Patient reports that he does well with his water intake goal but has not been going to the gym as much lately since starting a new job. He would like to continue targeting these SMART goals.Patient prefers to remain at the 1.7 mg dose due to the nausea and does not think he can tolerate titration at this time.     Goal weight for patient reported as 225#      Relevant Past Medical History and Co-morbidities  No past medical history on file.  Social History     Socioeconomic History    Marital status:    Tobacco Use    Smoking status: Never   Vaping Use    Vaping Use: Never used   Substance and Sexual Activity    Alcohol use: Never    Drug use: Never    Sexual activity: Defer       Allergies  Motrin [ibuprofen]    Current Medication List    Current Outpatient Medications:     Multiple Vitamins-Minerals (Multi Complete) capsule, Take 1 capsule by mouth Daily., Disp: , Rfl:     Semaglutide-Weight Management (Wegovy) 1.7 MG/0.75ML solution auto-injector, Inject 0.75 mL under the skin into the appropriate area as directed 1 (One) Time Per Week., Disp: 3 mL, Rfl: 0    Drug Interactions  None    Relevant Laboratory Values  Lab Results   Component Value Date    CHOL 125 11/15/2023    TRIG 55 11/15/2023  "   HDL 44 11/15/2023    LDL 69 11/15/2023     Body mass index is 30.69 kg/m².    Vaccinations:   Patient recommended to keep up with routine vaccinations.     Goals of Therapy  Clinical Goals or Therapeutic Targets: Prevent weight associated co-morbidities and complications      Date   8/24/23   9/21/23   10/19/23   11/15/23   12/14/23   1/12/24   Weight (lb) 275.2lb 264. 6 lbs 262.2 lb 254.8 lb 249.2 lb 239.2 lb   BMI kg/ 35.33 33.97 33.66 32.70 31.98 30.69   Waist Circumference (in)  48.4\" 47.25\" 46.5\" 46\" 42\" 43\"       Medication Assessment & Plan    Patient has current BMI of 30.69, which is considered Class II Obesity. Patient has lost 36 lbs. Congratulated patient on his success thus far.     Will order Wegovy 1.7 mg SC weekly.      The following medications will need dose adjustments/closer monitoring once the GLP1 is started: N/A    Patient HR elevated in clinic today. Pt normally does not have high HR. He had just came into clinic and also reported that he had drank a high caffeine coffee last last night that may be contributing. Patient is aware to monitor and if remains elevated to seek evaluation.     Discussed lifestyle modifications, including diet and exercise.  Patient education provided.     Worked with patient to create SMART Goal(s):  continue to target these goals   Drinking 64oz of water each day  Going to the gym 5 days a week  Avoiding bread- decrease consumption      Will follow-up with patient in clinic in 4 weeks.     Sabrina Multani, PharmD  1/12/2024  10:00 EST                    "

## 2024-02-13 ENCOUNTER — DISEASE STATE MANAGEMENT VISIT (OUTPATIENT)
Dept: PHARMACY | Facility: HOSPITAL | Age: 29
End: 2024-02-13
Payer: COMMERCIAL

## 2024-02-13 ENCOUNTER — OFFICE VISIT (OUTPATIENT)
Dept: FAMILY MEDICINE CLINIC | Age: 29
End: 2024-02-13
Payer: COMMERCIAL

## 2024-02-13 VITALS
WEIGHT: 239.4 LBS | OXYGEN SATURATION: 100 % | RESPIRATION RATE: 20 BRPM | DIASTOLIC BLOOD PRESSURE: 77 MMHG | SYSTOLIC BLOOD PRESSURE: 113 MMHG | HEART RATE: 78 BPM | BODY MASS INDEX: 30.72 KG/M2 | HEIGHT: 74 IN

## 2024-02-13 VITALS
BODY MASS INDEX: 30.7 KG/M2 | HEART RATE: 81 BPM | WEIGHT: 239.2 LBS | SYSTOLIC BLOOD PRESSURE: 144 MMHG | DIASTOLIC BLOOD PRESSURE: 92 MMHG

## 2024-02-13 DIAGNOSIS — E66.9 OBESITY (BMI 30.0-34.9): Primary | ICD-10-CM

## 2024-02-13 RX ORDER — SEMAGLUTIDE 2.4 MG/.75ML
2.4 INJECTION, SOLUTION SUBCUTANEOUS WEEKLY
Qty: 3 ML | Refills: 0 | Status: SHIPPED | OUTPATIENT
Start: 2024-02-13

## 2024-03-12 ENCOUNTER — DISEASE STATE MANAGEMENT VISIT (OUTPATIENT)
Dept: PHARMACY | Facility: HOSPITAL | Age: 29
End: 2024-03-12
Payer: COMMERCIAL

## 2024-03-12 VITALS
SYSTOLIC BLOOD PRESSURE: 132 MMHG | HEART RATE: 85 BPM | WEIGHT: 234.6 LBS | DIASTOLIC BLOOD PRESSURE: 84 MMHG | BODY MASS INDEX: 30.11 KG/M2

## 2024-03-12 RX ORDER — SEMAGLUTIDE 2.4 MG/.75ML
2.4 INJECTION, SOLUTION SUBCUTANEOUS WEEKLY
Qty: 3 ML | Refills: 0 | Status: SHIPPED | OUTPATIENT
Start: 2024-03-12

## 2024-03-12 NOTE — PROGRESS NOTES
[facility]  Weight Management Program      Noel Zarate is a 29 y.o. male referred to the [facility] by Breonna Saenz for clinical pharmacy and specialty pharmacy management of GLP1 for weight management.  The patient denies a personal history or family history of thyroid cancer and denies a personal history of pancreatitis.     Patient is currently on Wegovy 2.4 mg. Patient denies any lumps/swelling/hoarseness in neck/throat.  Patient reports that he is tolerating well other than nausea that is worse in the mornings.  He denies any vomiting. He currently takes an OTC stool softener to prevent issues with constipation. Patient denies any missed doses or trouble giving injection. Patient reports that he does well with his water intake goal but has not been going to the gym as much lately since starting a new job. Now that he is working at the [employer] again he plans on being more consistent with gym days. He would like to continue targeting these SMART goals.     Goal weight for patient reported as 225#      Relevant Past Medical History and Co-morbidities  No past medical history on file.  Social History     Socioeconomic History    Marital status:    Tobacco Use    Smoking status: Never   Vaping Use    Vaping status: Never Used   Substance and Sexual Activity    Alcohol use: Never    Drug use: Never    Sexual activity: Defer       Allergies  Motrin [ibuprofen]    Current Medication List    Current Outpatient Medications:     Multiple Vitamins-Minerals (Multi Complete) capsule, Take 1 capsule by mouth Daily., Disp: , Rfl:     ondansetron ODT (ZOFRAN-ODT) 4 MG disintegrating tablet, Take 1 tablet by mouth 3 times per day (as needed for Nausea) for 3 days, Disp: 9 tablet, Rfl: 0    Semaglutide-Weight Management (Wegovy) 2.4 MG/0.75ML solution auto-injector, Inject 2.4 mg under the skin into the appropriate area as directed 1 (One) Time Per Week., Disp: 3 mL, Rfl:  "0    Drug Interactions  None    Relevant Laboratory Values  Lab Results   Component Value Date    CHOL 125 11/15/2023    TRIG 55 11/15/2023    HDL 44 11/15/2023    LDL 69 11/15/2023     There is no height or weight on file to calculate BMI.    Vaccinations:   Patient recommended to keep up with routine vaccinations.     Goals of Therapy  Clinical Goals or Therapeutic Targets: Prevent weight associated co-morbidities and complications      Date   8/24/23   9/21/23   10/19/23   11/15/23   12/14/23   1/12/24   2/13/24   Weight (lb) 275.2lb 264. 6 lbs 262.2 lb 254.8 lb 249.2 lb 239.2 lb 239.2   BMI kg/ 35.33 33.97 33.66 32.70 31.98 30.69 30.70   Waist Circumference (in)  48.4\" 47.25\" 46.5\" 46\" 42\" 43\" 46\"     Date 3/12/24         Weight (lb) 234.6 lb         BMI kg/ 30.11         Waist Circumference (in)  43\"             Medication Assessment & Plan    Patient has current BMI of 30.11, which is considered Class II Obesity. Patient has lost 40.6 lbs. Congratulated patient on his success thus far.     Will order Wegovy 2.4 mg SC weekly.      The following medications will need dose adjustments/closer monitoring once the GLP1 is started: N/A    Patient is aware to monitor BP and HR and if remains elevated to seek evaluation.     Discussed lifestyle modifications, including diet and exercise.  Patient education provided.     Worked with patient to create SMART Goal(s):  continue to target these goals   Drinking 80 oz of water each day  Going to the gym 5 days a week  Avoiding bread- decrease consumption    Will follow-up with patient in clinic in 4 weeks.     Radha Rios RPH  3/12/2024  09:07 EDT                    "

## 2024-04-10 ENCOUNTER — DISEASE STATE MANAGEMENT VISIT (OUTPATIENT)
Dept: PHARMACY | Facility: HOSPITAL | Age: 29
End: 2024-04-10
Payer: COMMERCIAL

## 2024-04-10 VITALS
WEIGHT: 234 LBS | SYSTOLIC BLOOD PRESSURE: 151 MMHG | DIASTOLIC BLOOD PRESSURE: 93 MMHG | BODY MASS INDEX: 30.03 KG/M2 | HEIGHT: 74 IN | HEART RATE: 100 BPM

## 2024-04-10 RX ORDER — SEMAGLUTIDE 2.4 MG/.75ML
2.4 INJECTION, SOLUTION SUBCUTANEOUS WEEKLY
Qty: 3 ML | Refills: 0 | Status: SHIPPED | OUTPATIENT
Start: 2024-04-10

## 2024-04-10 NOTE — PROGRESS NOTES
[facility]  Weight Management Program      Noel Zarate is a 29 y.o. male referred to the [facility] by Breonna Saenz for clinical pharmacy and specialty pharmacy management of GLP1 for weight management.  The patient denies a personal history or family history of thyroid cancer and denies a personal history of pancreatitis.     Patient is currently on Wegovy 2.4 mg. Patient denies any lumps/swelling/hoarseness in neck/throat.  Patient reports that he is tolerating well other than nausea that is worse in the mornings.  He reports tolerating well. Patient denies any missed doses or trouble giving injection. Patient reports that he is doing well with SMART goals and that limiting bread can present its challenges. He would like to continue targeting these SMART goals.     Goal weight for patient reported as 225#      Relevant Past Medical History and Co-morbidities  No past medical history on file.  Social History     Socioeconomic History    Marital status:    Tobacco Use    Smoking status: Never   Vaping Use    Vaping status: Never Used   Substance and Sexual Activity    Alcohol use: Never    Drug use: Never    Sexual activity: Defer       Allergies  Motrin [ibuprofen]    Current Medication List    Current Outpatient Medications:     Multiple Vitamins-Minerals (Multi Complete) capsule, Take 1 capsule by mouth Daily., Disp: , Rfl:     ondansetron ODT (ZOFRAN-ODT) 4 MG disintegrating tablet, Take 1 tablet by mouth 3 times per day (as needed for Nausea) for 3 days, Disp: 9 tablet, Rfl: 0    Semaglutide-Weight Management (Wegovy) 2.4 MG/0.75ML solution auto-injector, Inject 2.4 mg under the skin into the appropriate area as directed 1 (One) Time Per Week., Disp: 3 mL, Rfl: 0    Drug Interactions  None    Relevant Laboratory Values  Lab Results   Component Value Date    CHOL 125 11/15/2023    TRIG 55 11/15/2023    HDL 44 11/15/2023    LDL 69 11/15/2023     There is no height  "or weight on file to calculate BMI.    Vaccinations:   Patient recommended to keep up with routine vaccinations.     Goals of Therapy  Clinical Goals or Therapeutic Targets: Prevent weight associated co-morbidities and complications      Date   8/24/23   9/21/23   10/19/23   11/15/23   12/14/23   1/12/24   2/13/24   Weight (lb) 275.2lb 264. 6 lbs 262.2 lb 254.8 lb 249.2 lb 239.2 lb 239.2   BMI kg/ 35.33 33.97 33.66 32.70 31.98 30.69 30.70   Waist Circumference (in)  48.4\" 47.25\" 46.5\" 46\" 42\" 43\" 46\"     Date 3/12/24 4/10/24        Weight (lb) 234.6 lb 234 lb        BMI kg/ 30.11 30.03        Waist Circumference (in)  43\" 43\"            Medication Assessment & Plan    Patient has current BMI of 30.11, which is considered Class II Obesity. Patient has lost 40.6 lbs. Congratulated patient on his success thus far.     Will order Wegovy 2.4 mg SC weekly.      The following medications will need dose adjustments/closer monitoring once the GLP1 is started: N/A    Patient is aware to monitor BP and HR and if remains elevated to seek evaluation. Reports controlled at home and always seems to be on elevated side when at clinic.     Discussed lifestyle modifications, including diet and exercise.  Patient education provided.     Worked with patient to create SMART Goal(s):  continue to target these goals   Drinking 80 oz of water each day  Going to the gym 5 days a week  Avoiding bread- decrease consumption    Will follow-up with patient in clinic in 4 weeks.     Sabrina Monge. Rangel, PharmD  4/10/2024  08:30 EDT                    "

## 2024-05-08 ENCOUNTER — DISEASE STATE MANAGEMENT VISIT (OUTPATIENT)
Dept: PHARMACY | Facility: HOSPITAL | Age: 29
End: 2024-05-08
Payer: COMMERCIAL

## 2024-05-08 VITALS
SYSTOLIC BLOOD PRESSURE: 157 MMHG | BODY MASS INDEX: 29.34 KG/M2 | DIASTOLIC BLOOD PRESSURE: 88 MMHG | HEIGHT: 74 IN | HEART RATE: 78 BPM | WEIGHT: 228.6 LBS

## 2024-05-08 RX ORDER — SEMAGLUTIDE 2.4 MG/.75ML
2.4 INJECTION, SOLUTION SUBCUTANEOUS WEEKLY
Qty: 3 ML | Refills: 0 | Status: SHIPPED | OUTPATIENT
Start: 2024-05-08

## 2024-05-16 ENCOUNTER — OFFICE VISIT (OUTPATIENT)
Dept: FAMILY MEDICINE CLINIC | Age: 29
End: 2024-05-16
Payer: COMMERCIAL

## 2024-05-16 VITALS
BODY MASS INDEX: 28.85 KG/M2 | SYSTOLIC BLOOD PRESSURE: 119 MMHG | OXYGEN SATURATION: 99 % | HEIGHT: 74 IN | DIASTOLIC BLOOD PRESSURE: 74 MMHG | HEART RATE: 71 BPM | WEIGHT: 224.8 LBS

## 2024-05-16 DIAGNOSIS — E66.9 OBESITY (BMI 30.0-34.9): ICD-10-CM

## 2024-05-16 DIAGNOSIS — E66.3 OVERWEIGHT (BMI 25.0-29.9): Primary | ICD-10-CM

## 2024-05-16 PROCEDURE — 84480 ASSAY TRIIODOTHYRONINE (T3): CPT | Performed by: NURSE PRACTITIONER

## 2024-05-16 PROCEDURE — 84439 ASSAY OF FREE THYROXINE: CPT | Performed by: NURSE PRACTITIONER

## 2024-05-16 PROCEDURE — 80061 LIPID PANEL: CPT | Performed by: NURSE PRACTITIONER

## 2024-05-16 PROCEDURE — 99213 OFFICE O/P EST LOW 20 MIN: CPT | Performed by: NURSE PRACTITIONER

## 2024-05-16 PROCEDURE — 82306 VITAMIN D 25 HYDROXY: CPT | Performed by: NURSE PRACTITIONER

## 2024-05-16 PROCEDURE — 83036 HEMOGLOBIN GLYCOSYLATED A1C: CPT | Performed by: NURSE PRACTITIONER

## 2024-05-16 PROCEDURE — 80050 GENERAL HEALTH PANEL: CPT | Performed by: NURSE PRACTITIONER

## 2024-05-16 NOTE — PROGRESS NOTES
Chief Complaint  Follow-up (Obesity (BMI 30.0-34.9))    Subjective      History of Present Illness        Noel Zarate is a 29 y.o. male who presents to [facility] for follow up of weight loss management.     BMI Readings from Last 1 Encounters:   05/16/24 28.85 kg/m²     Documented weights    05/16/24 0907   Weight: 102 kg (224 lb 12.8 oz)         Patient is currently taking medication Wegovy 2.4mg weekly.  He does not experience side effects. Has a history of constipation and does take stool softener to help prevent.   His current weight is 224 lbs for a total weight loss of 53lbs. Pt denies any dysphagia, hoarseness, neck swelling/ lumps or abdominal pain. Pt reports having a decreased appetite and feeling linda faster. He states that he has purchased a weight bench and has been lifting weights several times a week.     Review of Systems   Constitutional:  Positive for appetite change.        Decreased appetite on Wegovy    HENT: Negative.     Eyes: Negative.    Respiratory: Negative.     Cardiovascular: Negative.    Gastrointestinal: Negative.    Endocrine: Negative.    Genitourinary: Negative.    Musculoskeletal: Negative.    Skin: Negative.    Allergic/Immunologic: Negative.    Neurological: Negative.    Hematological: Negative.    Psychiatric/Behavioral: Negative.            History reviewed. No pertinent past medical history.  Past Surgical History:   Procedure Laterality Date    EAR TUBES      MANDIBLE SURGERY        Family History   Problem Relation Age of Onset    Heart disease Father       Social History     Socioeconomic History    Marital status:    Tobacco Use    Smoking status: Never   Vaping Use    Vaping status: Never Used   Substance and Sexual Activity    Alcohol use: Never    Drug use: Never    Sexual activity: Defer      Allergies   Allergen Reactions    Motrin [Ibuprofen] Other (See Comments)     Thinks it caused rash as a child      Current Outpatient  "Medications on File Prior to Visit   Medication Sig Dispense Refill    Multiple Vitamins-Minerals (Multi Complete) capsule Take 1 capsule by mouth Daily.      ondansetron ODT (ZOFRAN-ODT) 4 MG disintegrating tablet Take 1 tablet by mouth 3 times per day (as needed for Nausea) for 3 days 9 tablet 0    Semaglutide-Weight Management (Wegovy) 2.4 MG/0.75ML solution auto-injector Inject 2.4 mg under the skin into the appropriate area as directed 1 (One) Time Per Week. 3 mL 0     No current facility-administered medications on file prior to visit.        The following portions of the patient's history were reviewed and updated as appropriate: allergies, current medications, past family history, past social history, past medical history, past surgical history and problem list.     Objective   Vital Signs:  /74 (BP Location: Right arm, Patient Position: Sitting, Cuff Size: Small Adult)   Pulse 71   Ht 188 cm (74.02\")   Wt 102 kg (224 lb 12.8 oz)   SpO2 99%   BMI 28.85 kg/m²   Estimated body mass index is 28.85 kg/m² as calculated from the following:    Height as of this encounter: 188 cm (74.02\").    Weight as of this encounter: 102 kg (224 lb 12.8 oz).                   Physical Exam  Vitals reviewed.   Constitutional:       Comments: overweight   HENT:      Head: Normocephalic.      Nose: Nose normal.      Mouth/Throat:      Mouth: Mucous membranes are moist.      Pharynx: Oropharynx is clear.   Eyes:      Extraocular Movements: Extraocular movements intact.      Pupils: Pupils are equal, round, and reactive to light.   Neck:      Comments: No thyroidmegaly, no cysts or nodules palpated.  Cardiovascular:      Rate and Rhythm: Normal rate and regular rhythm.      Pulses: Normal pulses.   Pulmonary:      Effort: Pulmonary effort is normal.      Breath sounds: Normal breath sounds.   Abdominal:      General: Bowel sounds are normal.      Palpations: Abdomen is soft.      Tenderness: There is no abdominal " tenderness.   Musculoskeletal:         General: Normal range of motion.      Cervical back: Normal range of motion.   Skin:     General: Skin is warm and dry.   Neurological:      General: No focal deficit present.      Mental Status: He is alert and oriented to person, place, and time. Mental status is at baseline.   Psychiatric:         Mood and Affect: Mood normal.         Thought Content: Thought content normal.         Judgment: Judgment normal.          No visits with results within 3 Month(s) from this visit.   Latest known visit with results is:   Disease State Management Visit on 10/19/2023   Component Date Value Ref Range Status    Glucose 11/15/2023 87  65 - 99 mg/dL Final    BUN 11/15/2023 14  6 - 20 mg/dL Final    Creatinine 11/15/2023 1.05  0.76 - 1.27 mg/dL Final    Sodium 11/15/2023 143  136 - 145 mmol/L Final    Potassium 11/15/2023 4.3  3.5 - 5.2 mmol/L Final    Chloride 11/15/2023 106  98 - 107 mmol/L Final    CO2 11/15/2023 26.2  22.0 - 29.0 mmol/L Final    Calcium 11/15/2023 9.6  8.6 - 10.5 mg/dL Final    Total Protein 11/15/2023 7.0  6.0 - 8.5 g/dL Final    Albumin 11/15/2023 4.6  3.5 - 5.2 g/dL Final    ALT (SGPT) 11/15/2023 15  1 - 41 U/L Final    AST (SGOT) 11/15/2023 19  1 - 40 U/L Final    Alkaline Phosphatase 11/15/2023 51  39 - 117 U/L Final    Total Bilirubin 11/15/2023 0.4  0.0 - 1.2 mg/dL Final    Globulin 11/15/2023 2.4  gm/dL Final    A/G Ratio 11/15/2023 1.9  g/dL Final    BUN/Creatinine Ratio 11/15/2023 13.3  7.0 - 25.0 Final    Anion Gap 11/15/2023 10.8  5.0 - 15.0 mmol/L Final    eGFR 11/15/2023 100.4  >60.0 mL/min/1.73 Final    TSH 11/15/2023 1.580  0.270 - 4.200 uIU/mL Final    T Uptake 11/15/2023 1.00  0.80 - 1.30 TBI Final    T4, Total 11/15/2023 6.58  4.50 - 11.70 mcg/dL Final    T4 results may be falsely increased if patient taking Biotin.    Total Cholesterol 11/15/2023 125  0 - 200 mg/dL Final    Triglycerides 11/15/2023 55  0 - 150 mg/dL Final    HDL Cholesterol  11/15/2023 44  40 - 60 mg/dL Final    LDL Cholesterol  11/15/2023 69  0 - 100 mg/dL Final    VLDL Cholesterol 11/15/2023 12  5 - 40 mg/dL Final    LDL/HDL Ratio 11/15/2023 1.59   Final    Hemoglobin A1C 11/15/2023 5.00  4.80 - 5.60 % Final       Result Review :         Assessment and Plan     Diagnoses and all orders for this visit:    1. Overweight (BMI 25.0-29.9) (Primary)       Lab orders in computer and will be drawn today.    Continue stool softener to prevent constipation.     Pt is tolerating the weight loss medication and understands risks and benefits as discussed.  Pt is to continue current weight loss medication.       Follow Up   No follow-ups on file.  Patient was given instructions and counseling regarding his condition or for health maintenance advice. Please see specific information pulled into the AVS if appropriate.   Patient is to continue routine follow up with the [facility] and follow up with any labs ordered.         This document has been electronically signed by ANGELICA Rahman  May 16, 2024 09:21 EDT

## 2024-05-17 LAB
25(OH)D3 SERPL-MCNC: 40.7 NG/ML (ref 30–100)
ALBUMIN SERPL-MCNC: 4.8 G/DL (ref 3.5–5.2)
ALBUMIN/GLOB SERPL: 1.6 G/DL
ALP SERPL-CCNC: 55 U/L (ref 39–117)
ALT SERPL W P-5'-P-CCNC: 23 U/L (ref 1–41)
ANION GAP SERPL CALCULATED.3IONS-SCNC: 14.2 MMOL/L (ref 5–15)
AST SERPL-CCNC: 27 U/L (ref 1–40)
BILIRUB SERPL-MCNC: 0.5 MG/DL (ref 0–1.2)
BUN SERPL-MCNC: 22 MG/DL (ref 6–20)
BUN/CREAT SERPL: 18 (ref 7–25)
CALCIUM SPEC-SCNC: 10.1 MG/DL (ref 8.6–10.5)
CHLORIDE SERPL-SCNC: 101 MMOL/L (ref 98–107)
CHOLEST SERPL-MCNC: 138 MG/DL (ref 0–200)
CO2 SERPL-SCNC: 25.8 MMOL/L (ref 22–29)
CREAT SERPL-MCNC: 1.22 MG/DL (ref 0.76–1.27)
DEPRECATED RDW RBC AUTO: 41.5 FL (ref 37–54)
EGFRCR SERPLBLD CKD-EPI 2021: 83.3 ML/MIN/1.73
ERYTHROCYTE [DISTWIDTH] IN BLOOD BY AUTOMATED COUNT: 12.8 % (ref 12.3–15.4)
GLOBULIN UR ELPH-MCNC: 3 GM/DL
GLUCOSE SERPL-MCNC: 41 MG/DL (ref 65–99)
HBA1C MFR BLD: 5 % (ref 4.8–5.6)
HCT VFR BLD AUTO: 47.1 % (ref 37.5–51)
HDLC SERPL QL: 3
HDLC SERPL-MCNC: 46 MG/DL (ref 40–60)
HGB BLD-MCNC: 15.9 G/DL (ref 13–17.7)
LDLC SERPL CALC-MCNC: 82 MG/DL (ref 0–100)
MCH RBC QN AUTO: 29.9 PG (ref 26.6–33)
MCHC RBC AUTO-ENTMCNC: 33.8 G/DL (ref 31.5–35.7)
MCV RBC AUTO: 88.5 FL (ref 79–97)
PLATELET # BLD AUTO: 259 10*3/MM3 (ref 140–450)
PMV BLD AUTO: 11 FL (ref 6–12)
POTASSIUM SERPL-SCNC: 4.4 MMOL/L (ref 3.5–5.2)
PROT SERPL-MCNC: 7.8 G/DL (ref 6–8.5)
RBC # BLD AUTO: 5.32 10*6/MM3 (ref 4.14–5.8)
SODIUM SERPL-SCNC: 141 MMOL/L (ref 136–145)
T3 SERPL-MCNC: 122 NG/DL (ref 80–200)
T4 FREE SERPL-MCNC: 1.51 NG/DL (ref 0.93–1.7)
TRIGL SERPL-MCNC: 44 MG/DL (ref 0–150)
TSH SERPL DL<=0.05 MIU/L-ACNC: 1.69 UIU/ML (ref 0.27–4.2)
VLDLC SERPL-MCNC: 10 MG/DL (ref 5–40)
WBC NRBC COR # BLD AUTO: 6.59 10*3/MM3 (ref 3.4–10.8)

## 2024-06-05 ENCOUNTER — DISEASE STATE MANAGEMENT VISIT (OUTPATIENT)
Dept: PHARMACY | Facility: HOSPITAL | Age: 29
End: 2024-06-05
Payer: COMMERCIAL

## 2024-06-05 VITALS
HEART RATE: 93 BPM | SYSTOLIC BLOOD PRESSURE: 135 MMHG | HEIGHT: 74 IN | WEIGHT: 222 LBS | BODY MASS INDEX: 28.49 KG/M2 | DIASTOLIC BLOOD PRESSURE: 87 MMHG

## 2024-06-05 RX ORDER — SEMAGLUTIDE 2.4 MG/.75ML
2.4 INJECTION, SOLUTION SUBCUTANEOUS WEEKLY
Qty: 3 ML | Refills: 0 | Status: SHIPPED | OUTPATIENT
Start: 2024-06-05

## 2024-06-05 NOTE — PROGRESS NOTES
[facility] Management Clinic  Weight Management Program      Noel Zarate is a 29 y.o. male referred to the [facility] by Breonna Saenz for clinical pharmacy and specialty pharmacy management of GLP1 for weight management.  The patient denies a personal history or family history of thyroid cancer and denies a personal history of pancreatitis.     Patient is currently on Wegovy 2.4 mg. Patient denies any lumps/swelling/hoarseness in neck/throat.  Patient reports that he is tolerating medication well. He occasionally gets constipation but takes a daily stool softener that helps.  He reports tolerating well. Patient denies any missed doses or trouble giving injection. Patient reports that he is doing well with SMART goals and that limiting bread can present its challenges. He would like to continue targeting these SMART goals.     Goal weight for patient reported as 225 lbs      Relevant Past Medical History and Co-morbidities  No past medical history on file.  Social History     Socioeconomic History    Marital status:    Tobacco Use    Smoking status: Never   Vaping Use    Vaping status: Never Used   Substance and Sexual Activity    Alcohol use: Never    Drug use: Never    Sexual activity: Defer       Allergies  Motrin [ibuprofen]    Current Medication List    Current Outpatient Medications:     Multiple Vitamins-Minerals (Multi Complete) capsule, Take 1 capsule by mouth Daily., Disp: , Rfl:     ondansetron ODT (ZOFRAN-ODT) 4 MG disintegrating tablet, Take 1 tablet by mouth 3 times per day (as needed for Nausea) for 3 days, Disp: 9 tablet, Rfl: 0    Drug Interactions  None    Relevant Laboratory Values  Lab Results   Component Value Date    CHOL 138 05/16/2024    TRIG 44 05/16/2024    HDL 46 05/16/2024    LDL 82 05/16/2024     Body mass index is 28.49 kg/m².    Vaccinations:   Patient recommended to keep up with routine vaccinations.     Goals of Therapy  Clinical Goals or Therapeutic  "Targets: Prevent weight associated co-morbidities and complications      Date   8/24/23   9/21/23   10/19/23   11/15/23   12/14/23   1/12/24   2/13/24   Weight (lb) 275.2lb 264. 6 lbs 262.2 lb 254.8 lb 249.2 lb 239.2 lb 239.2   BMI kg/ 35.33 33.97 33.66 32.70 31.98 30.69 30.70   Waist Circumference (in)  48.4\" 47.25\" 46.5\" 46\" 42\" 43\" 46\"     Date 3/12/24 4/10/24 5/8/24 6/5/24      Weight (lb) 234.6 lb 234 lb 228.6 lb 222 lb      BMI kg/ 30.11 30.03 29.33       Waist Circumference (in)  43\" 43\" 42.5\"           Medication Assessment & Plan    Patient has current BMI of 29.33, which is considered overweight. Patient has lost 46.6 lbs. Congratulated patient on his success thus far.     Will order Wegovy 2.4 mg SC weekly.      The following medications will need dose adjustments/closer monitoring once the GLP1 is started: N/A    Discussed lifestyle modifications, including diet and exercise.  Patient education provided.     Worked with patient to create SMART Goal(s):  continue to target these goals   Drinking 80 oz of water each day  Going to the gym 5 days a week  Avoiding bread- decrease consumption    Labs were reviewed with Breonna at his last appointment with her.    Will follow-up with patient in clinic in 4 weeks.     Jennifer Wang, PharmD  6/5/2024  09:22 EDT                    "

## 2024-06-28 ENCOUNTER — DISEASE STATE MANAGEMENT VISIT (OUTPATIENT)
Dept: PHARMACY | Facility: HOSPITAL | Age: 29
End: 2024-06-28
Payer: COMMERCIAL

## 2024-06-28 VITALS
WEIGHT: 218.8 LBS | DIASTOLIC BLOOD PRESSURE: 80 MMHG | HEART RATE: 96 BPM | SYSTOLIC BLOOD PRESSURE: 117 MMHG | BODY MASS INDEX: 28.08 KG/M2

## 2024-06-28 RX ORDER — SEMAGLUTIDE 2.4 MG/.75ML
2.4 INJECTION, SOLUTION SUBCUTANEOUS WEEKLY
Qty: 3 ML | Refills: 0 | Status: SHIPPED | OUTPATIENT
Start: 2024-06-28

## 2024-06-28 NOTE — PROGRESS NOTES
[facility]ication Management Clinic  Weight Management Program      Noel Zarate is a 29 y.o. male referred to the [facility] by Breonna Saenz for clinical pharmacy and specialty pharmacy management of GLP1 for weight management.  The patient denies a personal history or family history of thyroid cancer and denies a personal history of pancreatitis.     Patient is currently on Wegovy 2.4 mg. Patient denies any lumps/swelling/hoarseness in neck/throat. Patient reports that he is tolerating medication well. He occasionally gets constipation but takes a daily stool softener that helps.  He reports tolerating well. Patient denies any missed doses or trouble giving injection. Patient reports that he is doing well with SMART goals and that limiting bread can present its challenges. He would like to continue targeting these SMART goals.     Goal weight for patient reported as 225 lbs      Relevant Past Medical History and Co-morbidities  No past medical history on file.  Social History     Socioeconomic History    Marital status:    Tobacco Use    Smoking status: Never   Vaping Use    Vaping status: Never Used   Substance and Sexual Activity    Alcohol use: Never    Drug use: Never    Sexual activity: Defer       Allergies  Motrin [ibuprofen]    Current Medication List    Current Outpatient Medications:     Multiple Vitamins-Minerals (Multi Complete) capsule, Take 1 capsule by mouth Daily., Disp: , Rfl:     ondansetron ODT (ZOFRAN-ODT) 4 MG disintegrating tablet, Take 1 tablet by mouth 3 times per day (as needed for Nausea) for 3 days, Disp: 9 tablet, Rfl: 0    Semaglutide-Weight Management (Wegovy) 2.4 MG/0.75ML solution auto-injector, Inject 2.4 mg under the skin into the appropriate area as directed 1 (One) Time Per Week., Disp: 3 mL, Rfl: 0    Drug Interactions  None    Relevant Laboratory Values  Lab Results   Component Value Date    CHOL 138 05/16/2024    TRIG 44 05/16/2024    HDL 46 05/16/2024  "   LDL 82 05/16/2024     There is no height or weight on file to calculate BMI.    Vaccinations:   Patient recommended to keep up with routine vaccinations.     Goals of Therapy  Clinical Goals or Therapeutic Targets: Prevent weight associated co-morbidities and complications      Date   8/24/23   9/21/23   10/19/23   11/15/23   12/14/23   1/12/24   2/13/24   Weight (lb) 275.2lb 264. 6 lbs 262.2 lb 254.8 lb 249.2 lb 239.2 lb 239.2   BMI kg/ 35.33 33.97 33.66 32.70 31.98 30.69 30.70   Waist Circumference (in)  48.4\" 47.25\" 46.5\" 46\" 42\" 43\" 46\"     Date 3/12/24 4/10/24 5/8/24 6/5/24 6/28/24     Weight (lb) 234.6 lb 234 lb 228.6 lb 222 lb 218.8 lb     BMI kg/ 30.11 30.03 29.33       Waist Circumference (in)  43\" 43\" 42.5\"  38\"         Medication Assessment & Plan    Patient has current BMI of 28.08, which is considered overweight.   Patient has lost 56.4 lbs. Congratulated patient on his success thus far.     Will order Wegovy 2.4 mg SC weekly.      The following medications will need dose adjustments/closer monitoring once the GLP1 is started: N/A    Discussed lifestyle modifications, including diet and exercise.   Patient education provided.     Worked with patient to create SMART Goal(s):  continue to target these goals   Drinking 80 oz of water each day  Going to the gym 5 days a week  Avoiding bread- decrease consumption    Labs were reviewed with Breonna at his last appointment with her.    Will follow-up with patient in clinic in 4 weeks.     Radha Rios, PharmD  6/28/2024  13:29 EDT                    "

## 2024-07-30 ENCOUNTER — DISEASE STATE MANAGEMENT VISIT (OUTPATIENT)
Dept: PHARMACY | Facility: HOSPITAL | Age: 29
End: 2024-07-30
Payer: COMMERCIAL

## 2024-07-30 VITALS
HEART RATE: 89 BPM | WEIGHT: 225 LBS | BODY MASS INDEX: 28.88 KG/M2 | HEIGHT: 74 IN | DIASTOLIC BLOOD PRESSURE: 81 MMHG | SYSTOLIC BLOOD PRESSURE: 126 MMHG

## 2024-07-30 RX ORDER — SEMAGLUTIDE 2.4 MG/.75ML
2.4 INJECTION, SOLUTION SUBCUTANEOUS WEEKLY
Qty: 9 ML | Refills: 0 | Status: SHIPPED | OUTPATIENT
Start: 2024-07-30

## 2024-07-30 NOTE — PROGRESS NOTES
[facility]  Weight Management Program      Noel Zarate is a 29 y.o. male referred to the [facility] by Breonna Saenz for clinical pharmacy and specialty pharmacy management of GLP1 for weight management.  The patient denies a personal history or family history of thyroid cancer and denies a personal history of pancreatitis.     Patient is currently on Wegovy 2.4 mg. Patient denies any lumps/swelling/hoarseness in neck/throat. Patient reports that he is tolerating medication well. He occasionally gets constipation but takes a daily stool softener that helps.  He reports tolerating well. Patient denies any missed doses or trouble giving injection. Patient reports that he is doing well with SMART goals and that limiting bread can present its challenges. He would like to continue targeting these SMART goals. Patient continues regular exercise and diet in conjunction with medication.    Goal weight for patient reported as 225 lbs      Relevant Past Medical History and Co-morbidities  No past medical history on file.  Social History     Socioeconomic History    Marital status:    Tobacco Use    Smoking status: Never   Vaping Use    Vaping status: Never Used   Substance and Sexual Activity    Alcohol use: Never    Drug use: Never    Sexual activity: Defer       Allergies  Motrin [ibuprofen]    Current Medication List    Current Outpatient Medications:     Multiple Vitamins-Minerals (Multi Complete) capsule, Take 1 capsule by mouth Daily., Disp: , Rfl:     ondansetron ODT (ZOFRAN-ODT) 4 MG disintegrating tablet, Take 1 tablet by mouth 3 times per day (as needed for Nausea) for 3 days, Disp: 9 tablet, Rfl: 0    Semaglutide-Weight Management (Wegovy) 2.4 MG/0.75ML solution auto-injector, Inject 2.4 mg under the skin into the appropriate area as directed 1 (One) Time Per Week., Disp: 3 mL, Rfl: 0    Drug Interactions  None    Relevant Laboratory Values  Lab Results   Component  "Value Date    CHOL 138 05/16/2024    TRIG 44 05/16/2024    HDL 46 05/16/2024    LDL 82 05/16/2024     Body mass index is 28.87 kg/m².    Vaccinations:   Patient recommended to keep up with routine vaccinations.     Goals of Therapy  Clinical Goals or Therapeutic Targets: Prevent weight associated co-morbidities and complications      Date   8/24/23   9/21/23   10/19/23   11/15/23   12/14/23   1/12/24   2/13/24   Weight (lb) 275.2lb 264. 6 lbs 262.2 lb 254.8 lb 249.2 lb 239.2 lb 239.2   BMI kg/ 35.33 33.97 33.66 32.70 31.98 30.69 30.70   Waist Circumference (in)  48.4\" 47.25\" 46.5\" 46\" 42\" 43\" 46\"     Date 3/12/24 4/10/24 5/8/24 6/5/24 6/28/24 7/30/24    Weight (lb) 234.6 lb 234 lb 228.6 lb 222 lb 218.8 lb 225 lb    BMI kg/ 30.11 30.03 29.33   28.87    Waist Circumference (in)  43\" 43\" 42.5\"  38\" 42\"        Medication Assessment & Plan    Patient has current BMI of 28.08, which is considered overweight. Patient has lost 56.4 lbs. Congratulated patient on his success thus far.     Will order Wegovy 2.4 mg SC weekly--90 day fill sent 7/30/24.      The following medications will need dose adjustments/closer monitoring once the GLP1 is started: N/A    Discussed lifestyle modifications, including diet and exercise.   Patient education provided.     Worked with patient to create SMART Goal(s):  continue to target these goals   Drinking 80 oz of water each day  Going to the gym 5 days a week  Avoiding bread- decrease consumption    Labs were reviewed with Breonna at his last appointment with her.    Will follow-up with patient in clinic in 4 weeks.     Jennifer Wang, PharmD  7/30/2024  13:25 EDT                    "